# Patient Record
Sex: MALE | Race: WHITE | NOT HISPANIC OR LATINO | Employment: FULL TIME | URBAN - NONMETROPOLITAN AREA
[De-identification: names, ages, dates, MRNs, and addresses within clinical notes are randomized per-mention and may not be internally consistent; named-entity substitution may affect disease eponyms.]

---

## 2022-12-29 PROCEDURE — U0003 INFECTIOUS AGENT DETECTION BY NUCLEIC ACID (DNA OR RNA); SEVERE ACUTE RESPIRATORY SYNDROME CORONAVIRUS 2 (SARS-COV-2) (CORONAVIRUS DISEASE [COVID-19]), AMPLIFIED PROBE TECHNIQUE, MAKING USE OF HIGH THROUGHPUT TECHNOLOGIES AS DESCRIBED BY CMS-2020-01-R: HCPCS | Performed by: NURSE PRACTITIONER

## 2023-04-08 ENCOUNTER — HOSPITAL ENCOUNTER (EMERGENCY)
Facility: HOSPITAL | Age: 33
Discharge: HOME | End: 2023-04-08
Attending: STUDENT IN AN ORGANIZED HEALTH CARE EDUCATION/TRAINING PROGRAM | Admitting: STUDENT IN AN ORGANIZED HEALTH CARE EDUCATION/TRAINING PROGRAM
Payer: MEDICAID

## 2023-04-08 VITALS
HEART RATE: 65 BPM | BODY MASS INDEX: 46.74 KG/M2 | WEIGHT: 254 LBS | SYSTOLIC BLOOD PRESSURE: 143 MMHG | HEIGHT: 62 IN | DIASTOLIC BLOOD PRESSURE: 95 MMHG | RESPIRATION RATE: 18 BRPM | OXYGEN SATURATION: 98 % | TEMPERATURE: 97.5 F

## 2023-04-08 DIAGNOSIS — K02.9 PAIN DUE TO DENTAL CARIES: Primary | ICD-10-CM

## 2023-04-08 PROCEDURE — 99283 EMERGENCY DEPT VISIT LOW MDM: CPT

## 2023-04-08 RX ORDER — ACETAMINOPHEN 500 MG
1000 TABLET ORAL ONCE
Status: COMPLETED | OUTPATIENT
Start: 2023-04-08 | End: 2023-04-08

## 2023-04-08 RX ORDER — IBUPROFEN 400 MG/1
600 TABLET ORAL ONCE
Status: COMPLETED | OUTPATIENT
Start: 2023-04-08 | End: 2023-04-08

## 2023-04-08 RX ORDER — ACETAMINOPHEN 325 MG/1
650 TABLET ORAL EVERY 6 HOURS PRN
Qty: 80 TABLET | Refills: 0 | Status: SHIPPED | OUTPATIENT
Start: 2023-04-08 | End: 2023-04-08 | Stop reason: SDUPTHER

## 2023-04-08 RX ORDER — IBUPROFEN 600 MG/1
600 TABLET ORAL EVERY 6 HOURS PRN
Qty: 40 TABLET | Refills: 0 | Status: SHIPPED | OUTPATIENT
Start: 2023-04-08 | End: 2023-04-18

## 2023-04-08 RX ORDER — AMOXICILLIN AND CLAVULANATE POTASSIUM 875; 125 MG/1; MG/1
1 TABLET, FILM COATED ORAL 2 TIMES DAILY
Qty: 14 TABLET | Refills: 0 | Status: SHIPPED | OUTPATIENT
Start: 2023-04-08 | End: 2023-04-15

## 2023-04-08 RX ORDER — ACETAMINOPHEN 325 MG/1
650 TABLET ORAL EVERY 6 HOURS PRN
Qty: 80 TABLET | Refills: 0 | Status: SHIPPED | OUTPATIENT
Start: 2023-04-08 | End: 2023-04-18

## 2023-04-08 RX ORDER — AMOXICILLIN AND CLAVULANATE POTASSIUM 875; 125 MG/1; MG/1
1 TABLET, FILM COATED ORAL 2 TIMES DAILY
Qty: 14 TABLET | Refills: 0 | Status: SHIPPED | OUTPATIENT
Start: 2023-04-08 | End: 2023-04-08 | Stop reason: SDUPTHER

## 2023-04-08 RX ORDER — IBUPROFEN 600 MG/1
600 TABLET ORAL EVERY 6 HOURS PRN
Qty: 40 TABLET | Refills: 0 | Status: SHIPPED | OUTPATIENT
Start: 2023-04-08 | End: 2023-04-08 | Stop reason: SDUPTHER

## 2023-04-08 RX ADMIN — ACETAMINOPHEN 1000 MG: 500 TABLET, FILM COATED ORAL at 05:42

## 2023-04-08 RX ADMIN — IBUPROFEN 600 MG: 400 TABLET, FILM COATED ORAL at 05:42

## 2023-04-08 NOTE — DISCHARGE INSTRUCTIONS
Today you are seen for your dental pain.  You do not have any evidence of a significant abscess based my physical exam he does have a dead tooth that will need to be removed by dentist.  I know you are already working getting into a dentist so please try to get to a dentist as soon as possible for tooth extraction.  I prescribed Augmentin to help prevent worsening infection in the meantime.  Please take as prescribed.  Please take Tylenol Advil for to help with your pain.  If your symptoms worsen prior please return the emergency department immediately.

## 2023-04-08 NOTE — Clinical Note
[facility] EMERGENCY DEPARTMENT  [facility] [facility] [address]  Phone: [phone]    Arturo Venegas was seen and treated in our emergency department on 4/8/2023.  He may return to work on 04/12/2023.         Thank you for choosing [facility].    Shukri Grady MD

## 2023-04-08 NOTE — ED PROVIDER NOTES
"EMERGENCY DEPARTMENT ATTENDING NOTE    Patient Name: Arturo Venegas    Chief Complaint   Patient presents with   • Oral Swelling       PATIENT PRESENTATION:  Arturo Venegas is a very pleasant 32 y.o. male with no significant past medical she presents emerged department due to dental pain.    Patient states that he had some pain to the back top left upper tooth.  Feels there are some swelling to the area.  He has not had any fevers or chills.  Denies any vision changes or hearing changes any difficulty swallowing.  He is working on getting to a dentist and also presenting requesting antibiotics to help prevent worsening infection in the meantime.  He has not taken any pain medicines for the pain he describes his pain is mild at this time.      PHYSICAL EXAM:   VS: /95   Pulse 65   Temp 97.5 °F (36.4 °C) (Oral)   Resp 18   Ht 157.5 cm (62.01\")   Wt 115 kg (254 lb)   SpO2 98%   BMI 46.45 kg/m²   GENERAL: Well-appearing young man sitting with stretcher no acute distress; well-nourished, well-developed, awake, alert, no acute distress, nontoxic appearing, comfortable  EYES: PERRL, sclerae anicteric, extraocular movements grossly intact, symmetric lids  EARS, NOSE, MOUTH, THROAT: Approximately tooth 15 with evidence of decay but no surrounding signs of a buccal abscess or any facial swelling concerning for mandibular abscess; otherwise atraumatic external nose and ears, moist mucous membranes  NECK: symmetric, trachea midline  SKIN: warm and dry with no obvious rashes  PSYCHIATRIC: alert, pleasant and cooperative. Appropriate mood and affect.      MEDICAL DECISION MAKING:    Arturo Venegas is a 32 y.o. male presents to the emergency department with concerns for dental swelling and possible abscess.  His exam is extremely reassuring is clear evidence of a dying tooth but no evidence of any periapical abscess or any swelling that be concerning for a deeper oral abscess or mandibular abscess.  His vital signs are " reassuring his exam is fairly benign.  He was given Tylenol Profen for pain.  Discharged with Augmentin with plan to follow-up with a dentist soon as possible.  Given return precautions the emergency department for worsening symptoms.      ED Diagnosis:  Pain due to dental caries    Disposition: to home  Follow up plan: dentist follow up within 2 days, return to ED immediately if symptoms worsen        Signed:  Shukri Grady MD  Emergency Medicine Physician    Please note that portions of this note were completed with a voice recognition program.      Shukri Grady MD  04/08/23 [id number]

## 2023-05-28 PROCEDURE — 87070 CULTURE OTHR SPECIMN AEROBIC: CPT | Performed by: NURSE PRACTITIONER

## 2023-05-28 PROCEDURE — 87205 SMEAR GRAM STAIN: CPT | Performed by: NURSE PRACTITIONER

## 2025-02-02 PROCEDURE — 87636 SARSCOV2 & INF A&B AMP PRB: CPT | Performed by: NURSE PRACTITIONER
